# Patient Record
Sex: FEMALE | Race: BLACK OR AFRICAN AMERICAN
[De-identification: names, ages, dates, MRNs, and addresses within clinical notes are randomized per-mention and may not be internally consistent; named-entity substitution may affect disease eponyms.]

---

## 2023-11-13 ENCOUNTER — APPOINTMENT (OUTPATIENT)
Dept: ORTHOPEDIC SURGERY | Facility: CLINIC | Age: 58
End: 2023-11-13

## 2023-12-07 ENCOUNTER — APPOINTMENT (OUTPATIENT)
Dept: ORTHOPEDIC SURGERY | Facility: CLINIC | Age: 58
End: 2023-12-07
Payer: MEDICAID

## 2023-12-07 VITALS — HEIGHT: 63 IN

## 2023-12-07 DIAGNOSIS — Z78.9 OTHER SPECIFIED HEALTH STATUS: ICD-10-CM

## 2023-12-07 PROBLEM — Z00.00 ENCOUNTER FOR PREVENTIVE HEALTH EXAMINATION: Status: ACTIVE | Noted: 2023-12-07

## 2023-12-07 PROCEDURE — 73010 X-RAY EXAM OF SHOULDER BLADE: CPT | Mod: RT

## 2023-12-07 PROCEDURE — 20610 DRAIN/INJ JOINT/BURSA W/O US: CPT | Mod: RT

## 2023-12-07 PROCEDURE — J3490M: CUSTOM

## 2023-12-07 PROCEDURE — 73030 X-RAY EXAM OF SHOULDER: CPT | Mod: RT

## 2023-12-07 PROCEDURE — 99214 OFFICE O/P EST MOD 30 MIN: CPT | Mod: 25

## 2024-01-22 ENCOUNTER — APPOINTMENT (OUTPATIENT)
Dept: ORTHOPEDIC SURGERY | Facility: CLINIC | Age: 59
End: 2024-01-22
Payer: MEDICAID

## 2024-01-22 VITALS — HEIGHT: 63 IN

## 2024-01-22 PROCEDURE — 99214 OFFICE O/P EST MOD 30 MIN: CPT

## 2024-01-22 RX ORDER — DICLOFENAC SODIUM 75 MG/1
75 TABLET, DELAYED RELEASE ORAL
Qty: 20 | Refills: 1 | Status: ACTIVE | COMMUNITY
Start: 2024-01-22 | End: 1900-01-01

## 2024-01-22 NOTE — IMAGING
[Right] : right shoulder [There are no fractures, subluxations or dislocations. No significant abnormalities are seen] : There are no fractures, subluxations or dislocations. No significant abnormalities are seen [Type 2 acromion] : Type 2 acromion [de-identified] :   ----------------------------------------------------------------------------   Right shoulder exam:   Skin: no significant pertinent finding Inspection: no obvious deformity, no obvious masses, no swelling, no effusion, no atrophy ROM:     FF: 90a    ER: 30a    IR: S1 Tenderness:    (+) Anterior/Biceps:    (+) Posterior    (neg) Lateral    (+) Trapezius    (neg) Scapula    (mild) AC joint    (neg) Crepitus with ROM Stability:    (neg) Translation    (neg) Apprehension    (neg) Clicking Additional tests:     + impingment    (+) Neer's    (+)Hawkin's    (neg) Smith's    (neg) Speed    (neg) Cross chest adduction Strength:    FF: 5/5    ER: 5/5    IR: 5/5    Biceps: 5/5    Triceps: 5/5    Distal: 5/5 Neuro: In tact to light touch throughout Vascularity: Extremity warm and well perfused   [FreeTextEntry1] : mild djd, large SA spur

## 2024-01-22 NOTE — HISTORY OF PRESENT ILLNESS
[10] : 10 [3] : 3 [Dull/Aching] : dull/aching [Localized] : localized [Sharp] : sharp [Frequent] : frequent [de-identified] : This is Ms. JENNIFER DOVER  a 58 year old female who comes in today complaining of Rt shoulder pain. Pt states she has been feeling this pain for a while and pain got worse to the point where she cant sleep. When she lays on her bed and when she turnover she has to assist her Rt shoulder to move it. hand is stiff in the am.  [] : no [FreeTextEntry1] : Rt shoulder  [de-identified] : none

## 2024-01-22 NOTE — DISCUSSION/SUMMARY
[Medication Risks Reviewed] : Medication risks reviewed [de-identified] : MRI R shoulder to eval rtc continue PT/ HEP diclofenac 75mg  f/u p mri  ----------------------------------------------------------------------------  Patient warned of specific risks of medication related to bleeding, GI issues, increase blood pressure, and cardiac risks in addition to additional risks.  Patient advised to discuss with PMD  if any presence of stated issues.  ----------------------------------------------------------------------------  All relevant imaging studies pertinent to today's visit, including x-rays, MRI's and/or other advanced imaging studies (CT/etc) were independently interpreted and reviewed with the patient as needed. Implications of the studies together with the patient's clinical picture were discussed to formulate a working diagnosis and management options were detailed.  The patient was advised of the diagnosis.  The natural history of the pathology was explained in full. All questions were answered.  The risks and benefits of conservative and interventional treatment alternatives were explained to the patient  -----------------------------------------------  The patient was advised that an advanced diagnostic/imaging study (MRI/CT/etc) will be ordered to evaluate potential pathology in the affected area(s). The patient was further advised to follow up in the office to review the results of the study and determine further management that may be indicated.    Progress note completed by Santana Saeed PA-C working as a scribe for Dr Styles

## 2024-01-22 NOTE — REASON FOR VISIT
[FreeTextEntry2] : follow up right shoulder. Noted significant improvement from CSI but feels pain has returned. Has also been in PT/ doing HEP and using tylenol with mild help.

## 2024-02-12 ENCOUNTER — APPOINTMENT (OUTPATIENT)
Dept: ORTHOPEDIC SURGERY | Facility: CLINIC | Age: 59
End: 2024-02-12

## 2024-03-29 ENCOUNTER — APPOINTMENT (OUTPATIENT)
Dept: ORTHOPEDIC SURGERY | Facility: CLINIC | Age: 59
End: 2024-03-29
Payer: MEDICAID

## 2024-03-29 VITALS — WEIGHT: 150 LBS | BODY MASS INDEX: 26.58 KG/M2 | HEIGHT: 63 IN

## 2024-03-29 DIAGNOSIS — M24.119 OTHER ARTICULAR CARTILAGE DISORDERS, UNSPECIFIED SHOULDER: ICD-10-CM

## 2024-03-29 DIAGNOSIS — M75.41 IMPINGEMENT SYNDROME OF RIGHT SHOULDER: ICD-10-CM

## 2024-03-29 PROCEDURE — 99214 OFFICE O/P EST MOD 30 MIN: CPT

## 2024-04-12 ENCOUNTER — APPOINTMENT (OUTPATIENT)
Dept: ORTHOPEDIC SURGERY | Facility: CLINIC | Age: 59
End: 2024-04-12
Payer: MEDICAID

## 2024-04-12 PROCEDURE — 99214 OFFICE O/P EST MOD 30 MIN: CPT

## 2024-04-12 NOTE — DISCUSSION/SUMMARY
[Surgical risks reviewed] : Surgical risks reviewed [de-identified] : Reviewed MRI of the R shoulder  Discussed options in the form of continued PT and potential CSI injection of the R shoulder vs arthroscopic surgery Discussed R shoulder arthroscopy, extensive GHD, bursectomy, SAD and possible [place] patch application and possible tenotomy Discussed risk of simon with bicep tenotomy Plan for R shoulder arthroscopy, extensive GHD, bursectomy, SAD and possible Regenten patch application and possible tenotomy ----------------------------------------------------------------------------  The patient was advised of the diagnosis.  The natural history of the pathology was explained in full to the patient. All questions were answered.  The risks and benefits of surgical and non-surgical treatment were explained in full to the patient.  The patient demonstrated a full understanding of the surgical and non-surgical options.  The risks of surgery were outlined in full to the patient including but not limited to pain, stiffness, bleeding, scarring, infection, neurologic injury, vascular injury, failure to resolve symptoms, symptom recurrence, the need for further surgery, non-healing, implant failure, intraoperative fracture, wound breakdown, deep vein thrombosis, pulmonary embolism, anesthesia complications and even death.  The patient understood all the risks and accepted them and understood that other complications could occur that are not mentioned above.  The intraoperative plan, post-operative plan, post-operative expectations and limitations were explained in full.  Importance of postoperative rehabilitation and restriction compliance was explained and emphasized. Expectations from non-surgical treatment were explained in full as well.  The patient demonstrated a complete understanding of the treatment detailed, the risks and alternatives.   ----------------------------------------------------------------------------   All relevant imaging studies pertinent to today's visit, including x-rays, MRI's and/or other advanced imaging studies (CT/etc) were independently interpreted and reviewed with the patient as needed. Implications of the studies together with the patient's clinical picture were discussed to formulate a working diagnosis and management options were detailed.   The patient and/or guardian was advised of the diagnosis.  The natural history of the pathology was explained in full. All questions were answered.  The risks and benefits of conservative and interventional treatment alternatives were explained to the patient  The patient and/or guardian was advised if any advanced diagnostic/imaging study (MRI/CT/etc) is ordered to evaluate potential pathology in the affected area(s), they should follow up in the office to review the results of the study and determine further management that may be indicated.

## 2024-04-12 NOTE — DATA REVIEWED
[MRI] : MRI [Right] : of the right [Shoulder] : shoulder [I independently reviewed and interpreted images and report] : I independently reviewed and interpreted images and report [FreeTextEntry1] :  significant bursitis, cuff tendinopathy of the supraspinatous, mild GH OA, supraspinatous thinning, degenerative labral tearing

## 2024-04-12 NOTE — IMAGING
[Right] : right shoulder [There are no fractures, subluxations or dislocations. No significant abnormalities are seen] : There are no fractures, subluxations or dislocations. No significant abnormalities are seen [Type 2 acromion] : Type 2 acromion [de-identified] :   ----------------------------------------------------------------------------   Right shoulder exam:   Skin: no significant pertinent finding Inspection: no obvious deformity, no obvious masses, no swelling, no effusion, no atrophy ROM:     FF: 90a    ER: 30a    IR: S1 Tenderness:    (+) Anterior/Biceps:    (+) Posterior    (neg) Lateral    (+) Trapezius    (neg) Scapula    (mild) AC joint    (neg) Crepitus with ROM Stability:    (neg) Translation    (neg) Apprehension    (neg) Clicking Additional tests:     + impingment    (+) Neer's    (+)Hawkin's    (neg) Smith's    (neg) Speed    (neg) Cross chest adduction Strength:    FF: 5/5    ER: 5/5    IR: 5/5    Biceps: 5/5    Triceps: 5/5    Distal: 5/5 Neuro: In tact to light touch throughout Vascularity: Extremity warm and well perfused   [FreeTextEntry1] : mild djd, large SA spur

## 2024-04-12 NOTE — HISTORY OF PRESENT ILLNESS
[10] : 10 [3] : 3 [Dull/Aching] : dull/aching [Localized] : localized [Sharp] : sharp [Frequent] : frequent [de-identified] : This is Ms. JENNIFER DOVER  a 58 year old female who comes in today complaining of Rt shoulder pain. Pt states she has been feeling this pain for a while and pain got worse to the point where she cant sleep. When she lays on her bed and when she turnover she has to assist her Rt shoulder to move it. hand is stiff in the am.  [] : no [FreeTextEntry1] : Rt shoulder  [de-identified] : none

## 2024-04-16 PROBLEM — M24.119 LABRAL TEAR OF SHOULDER, DEGENERATIVE: Status: ACTIVE | Noted: 2024-04-12

## 2024-04-16 PROBLEM — M75.41 IMPINGEMENT SYNDROME OF RIGHT SHOULDER: Status: ACTIVE | Noted: 2023-12-07

## 2024-04-16 RX ORDER — CELECOXIB 100 MG/1
100 CAPSULE ORAL
Qty: 30 | Refills: 0 | Status: ACTIVE | COMMUNITY
Start: 2024-03-29

## 2024-04-16 NOTE — DISCUSSION/SUMMARY
[Medication Risks Reviewed] : Medication risks reviewed [de-identified] : MRI R shoulder to eval rtc continue PT/ HEP f/u p mri  ----------------------------------------------------------------------------  Patient warned of specific risks of medication related to bleeding, GI issues, increase blood pressure, and cardiac risks in addition to additional risks.  Patient advised to discuss with PMD  if any presence of stated issues.  ----------------------------------------------------------------------------  All relevant imaging studies pertinent to today's visit, including x-rays, MRI's and/or other advanced imaging studies (CT/etc) were independently interpreted and reviewed with the patient as needed. Implications of the studies together with the patient's clinical picture were discussed to formulate a working diagnosis and management options were detailed.  The patient was advised of the diagnosis.  The natural history of the pathology was explained in full. All questions were answered.  The risks and benefits of conservative and interventional treatment alternatives were explained to the patient  -----------------------------------------------  The patient was advised that an advanced diagnostic/imaging study (MRI/CT/etc) will be ordered to evaluate potential pathology in the affected area(s). The patient was further advised to follow up in the office to review the results of the study and determine further management that may be indicated.    Progress note completed by Santana Saeed PA-C working as a scribe for Dr Styles

## 2024-04-16 NOTE — HISTORY OF PRESENT ILLNESS
[10] : 10 [4] : 4 [Dull/Aching] : dull/aching [Localized] : localized [Sharp] : sharp [Frequent] : frequent [de-identified] : This is Ms. JENNIFER DOVER  a 58 year old female who comes in today complaining of Rt shoulder pain. Pt states she has been feeling this pain for a while and pain got worse to the point where she cant sleep. When she lays on her bed and when she turnover she has to assist her Rt shoulder to move it. hand is stiff in the am.  [] : no [FreeTextEntry1] : Rt shoulder  [de-identified] : none

## 2024-04-16 NOTE — IMAGING
[Right] : right shoulder [There are no fractures, subluxations or dislocations. No significant abnormalities are seen] : There are no fractures, subluxations or dislocations. No significant abnormalities are seen [Type 2 acromion] : Type 2 acromion [de-identified] :   ----------------------------------------------------------------------------   Right shoulder exam:   Skin: no significant pertinent finding Inspection: no obvious deformity, no obvious masses, no swelling, no effusion, no atrophy ROM:     FF: 90a    ER: 30a    IR: S1 Tenderness:    (+) Anterior/Biceps:    (+) Posterior    (neg) Lateral    (+) Trapezius    (neg) Scapula    (mild) AC joint    (neg) Crepitus with ROM Stability:    (neg) Translation    (neg) Apprehension    (neg) Clicking Additional tests:     + impingment    (+) Neer's    (+)Hawkin's    (neg) Smith's    (neg) Speed    (neg) Cross chest adduction Strength:    FF: 5/5    ER: 5/5    IR: 5/5    Biceps: 5/5    Triceps: 5/5    Distal: 5/5 Neuro: In tact to light touch throughout Vascularity: Extremity warm and well perfused   [FreeTextEntry1] : mild djd, large SA spur

## 2024-05-06 ENCOUNTER — APPOINTMENT (OUTPATIENT)
Dept: ORTHOPEDIC SURGERY | Facility: AMBULATORY SURGERY CENTER | Age: 59
End: 2024-05-06
Payer: MEDICAID

## 2024-05-06 PROCEDURE — 29827 SHO ARTHRS SRG RT8TR CUF RPR: CPT | Mod: AS,RT

## 2024-05-06 PROCEDURE — 29823 SHO ARTHRS SRG XTNSV DBRDMT: CPT | Mod: AS,59,RT

## 2024-05-06 PROCEDURE — 29826 SHO ARTHRS SRG DECOMPRESSION: CPT | Mod: AS,RT

## 2024-05-06 PROCEDURE — 29823 SHO ARTHRS SRG XTNSV DBRDMT: CPT | Mod: 59,RT

## 2024-05-06 PROCEDURE — 29827 SHO ARTHRS SRG RT8TR CUF RPR: CPT | Mod: RT

## 2024-05-06 PROCEDURE — 29826 SHO ARTHRS SRG DECOMPRESSION: CPT | Mod: RT

## 2024-05-06 RX ORDER — OXYCODONE AND ACETAMINOPHEN 5; 325 MG/1; MG/1
5-325 TABLET ORAL
Qty: 25 | Refills: 0 | Status: ACTIVE | COMMUNITY
Start: 2024-05-06 | End: 1900-01-01

## 2024-05-17 ENCOUNTER — APPOINTMENT (OUTPATIENT)
Dept: ORTHOPEDIC SURGERY | Facility: CLINIC | Age: 59
End: 2024-05-17
Payer: MEDICAID

## 2024-05-17 VITALS — BODY MASS INDEX: 26.58 KG/M2 | WEIGHT: 150 LBS | HEIGHT: 63 IN

## 2024-05-17 PROCEDURE — 99024 POSTOP FOLLOW-UP VISIT: CPT

## 2024-05-17 NOTE — DISCUSSION/SUMMARY
[Surgical risks reviewed] : Surgical risks reviewed [de-identified] : PO course discussed sling intermittently x 2 wks  Start PT  fu 4 wks   ----------------------------------------------------------------------------    All relevant imaging studies pertinent to today's visit, including x-rays, MRI's and/or other advanced imaging studies (CT/etc) were independently interpreted and reviewed with the patient as needed. Implications of the studies together with the patient's clinical picture were discussed to formulate a working diagnosis and management options were detailed.   The patient and/or guardian was advised of the diagnosis. The natural history of the pathology was explained in full. All questions were answered. The risks and benefits of conservative and interventional treatment alternatives were explained to the patient  The patient and/or guardian was advised if any advanced diagnostic/imaging study (MRI/CT/etc) is ordered to evaluate potential pathology in the affected area(s), they should follow up in the office to review the results of the study and determine further management that may be indicated.

## 2024-05-17 NOTE — IMAGING
[Right] : right shoulder [There are no fractures, subluxations or dislocations. No significant abnormalities are seen] : There are no fractures, subluxations or dislocations. No significant abnormalities are seen [Type 2 acromion] : Type 2 acromion [FreeTextEntry1] : mild djd, large SA spur  [de-identified] : incision CDI mild effusion good rom NVI  sutures removed, steri strips applied

## 2024-05-17 NOTE — HISTORY OF PRESENT ILLNESS
[6] : 6 [Dull/Aching] : dull/aching [Localized] : localized [Sharp] : sharp [Frequent] : frequent [de-identified] : This is Ms. JENNIFER DOVER  a 58 year old female who comes in today complaining of Rt shoulder pain. Pt states she has been feeling this pain for a while and pain got worse to the point where she cant sleep. When she lays on her bed and when she turnover she has to assist her Rt shoulder to move it. hand is stiff in the am.   s/p  RCR w regenetin patch, GHD, SAD, bicep tenotomy 5/6/24 [] : no [FreeTextEntry1] : Rt shoulder  [de-identified] : 5/6/24 [de-identified] : none [de-identified] : 5/6/24 [de-identified] : right shoulder

## 2024-05-17 NOTE — REASON FOR VISIT
[FreeTextEntry2] : 1st post op right shoulder. Doing well. pain is managed with medication. No F/C.  dos: 5/6/24

## 2024-06-19 ENCOUNTER — APPOINTMENT (OUTPATIENT)
Dept: ORTHOPEDIC SURGERY | Facility: CLINIC | Age: 59
End: 2024-06-19
Payer: MEDICAID

## 2024-06-19 DIAGNOSIS — Z98.890 OTHER SPECIFIED POSTPROCEDURAL STATES: ICD-10-CM

## 2024-06-19 DIAGNOSIS — M67.911 UNSPECIFIED DISORDER OF SYNOVIUM AND TENDON, RIGHT SHOULDER: ICD-10-CM

## 2024-06-19 DIAGNOSIS — M75.51 BURSITIS OF RIGHT SHOULDER: ICD-10-CM

## 2024-06-19 PROCEDURE — 99024 POSTOP FOLLOW-UP VISIT: CPT

## 2024-06-19 PROCEDURE — 73030 X-RAY EXAM OF SHOULDER: CPT | Mod: RT

## 2024-06-19 PROCEDURE — 73010 X-RAY EXAM OF SHOULDER BLADE: CPT | Mod: RT

## 2024-06-19 NOTE — HISTORY OF PRESENT ILLNESS
[2] : 2 [1] : 2 [Dull/Aching] : dull/aching [Localized] : localized [Sharp] : sharp [Frequent] : frequent [Sleep] : sleep [de-identified] : This is Ms. JENNIFER DOVER  a 58 year old female who comes in today complaining of Rt shoulder pain. Pt states she has been feeling this pain for a while and pain got worse to the point where she cant sleep. When she lays on her bed and when she turnover she has to assist her Rt shoulder to move it. hand is stiff in the am.   s/p  RCR w regenetin patch, GHD, SAD, bicep tenotomy 5/6/24 06/19/2024 This is Ms. JENNIFER DOVER  a 58 year old female who comes in today for her 2nd PO. She is doing well, pain has decreased. Pt is helping. [] : no [FreeTextEntry1] : Rt shoulder  [FreeTextEntry8] : Achy at night  [de-identified] : 5/6/24 [de-identified] : none [de-identified] : 5/6/24 [de-identified] : right shoulder

## 2024-06-19 NOTE — IMAGING
[Right] : right shoulder [There are no fractures, subluxations or dislocations. No significant abnormalities are seen] : There are no fractures, subluxations or dislocations. No significant abnormalities are seen [Type 2 acromion] : Type 2 acromion [de-identified] :   ----------------------------------------------------------------------------   ROM 0-90 active 130 passive incisions healed  Neuro: In tact to light touch throughout Vascularity: Extremity warm and well perfused    [FreeTextEntry1] : evidence of complete decompression subachromial spur

## 2024-06-19 NOTE — REASON FOR VISIT
[FreeTextEntry2] : 2nd post op right shoulder. Doing well. pain is managed with medication. No F/C.  dos: 5/6/24

## 2024-06-19 NOTE — DISCUSSION/SUMMARY
[Surgical risks reviewed] : Surgical risks reviewed [de-identified] : PO course discussed Continue Physical Therapy f/u 6 weeks  ----------------------------------------------------------------------------    All relevant imaging studies pertinent to today's visit, including x-rays, MRI's and/or other advanced imaging studies (CT/etc) were independently interpreted and reviewed with the patient as needed. Implications of the studies together with the patient's clinical picture were discussed to formulate a working diagnosis and management options were detailed.   The patient and/or guardian was advised of the diagnosis. The natural history of the pathology was explained in full. All questions were answered. The risks and benefits of conservative and interventional treatment alternatives were explained to the patient  The patient and/or guardian was advised if any advanced diagnostic/imaging study (MRI/CT/etc) is ordered to evaluate potential pathology in the affected area(s), they should follow up in the office to review the results of the study and determine further management that may be indicated.

## 2024-07-31 ENCOUNTER — APPOINTMENT (OUTPATIENT)
Dept: ORTHOPEDIC SURGERY | Facility: CLINIC | Age: 59
End: 2024-07-31
Payer: MEDICAID

## 2024-07-31 VITALS — BODY MASS INDEX: 26.58 KG/M2 | WEIGHT: 150 LBS | HEIGHT: 63 IN

## 2024-07-31 DIAGNOSIS — Z98.890 OTHER SPECIFIED POSTPROCEDURAL STATES: ICD-10-CM

## 2024-07-31 PROCEDURE — 99024 POSTOP FOLLOW-UP VISIT: CPT

## 2024-07-31 RX ORDER — METHYLPREDNISOLONE 4 MG/1
4 TABLET ORAL
Qty: 1 | Refills: 0 | Status: ACTIVE | COMMUNITY
Start: 2024-07-31 | End: 1900-01-01

## 2024-07-31 RX ORDER — IBUPROFEN 800 MG/1
800 TABLET, FILM COATED ORAL 3 TIMES DAILY
Qty: 30 | Refills: 0 | Status: ACTIVE | COMMUNITY
Start: 2024-07-31 | End: 1900-01-01

## 2024-07-31 NOTE — DISCUSSION/SUMMARY
[Medication Risks Reviewed] : Medication risks reviewed [de-identified] : PO course discussed Continue Physical Therapy MDP ibuprofen 800mg before attending PT, advised not to take while utilizing MDP f/u 4 weeks  ----------------------------------------------------------------------------    All relevant imaging studies pertinent to today's visit, including x-rays, MRI's and/or other advanced imaging studies (CT/etc) were independently interpreted and reviewed with the patient as needed. Implications of the studies together with the patient's clinical picture were discussed to formulate a working diagnosis and management options were detailed.   The patient and/or guardian was advised of the diagnosis. The natural history of the pathology was explained in full. All questions were answered. The risks and benefits of conservative and interventional treatment alternatives were explained to the patient  The patient and/or guardian was advised if any advanced diagnostic/imaging study (MRI/CT/etc) is ordered to evaluate potential pathology in the affected area(s), they should follow up in the office to review the results of the study and determine further management that may be indicated.

## 2024-07-31 NOTE — HISTORY OF PRESENT ILLNESS
[6] : 6 [1] : 2 [Dull/Aching] : dull/aching [Localized] : localized [Sharp] : sharp [Frequent] : frequent [Sleep] : sleep [de-identified] : This is Ms. JENNIFER DOVER  a 58 year old female who comes in today complaining of Rt shoulder pain. Pt states she has been feeling this pain for a while and pain got worse to the point where she cant sleep. When she lays on her bed and when she turnover she has to assist her Rt shoulder to move it. hand is stiff in the am.   s/p  RCR w regenetin patch, GHD, SAD, bicep tenotomy 5/6/24 06/19/2024 This is Ms. JENNIFER DOVER  a 58 year old female who comes in today for her 2nd PO. She is doing well, pain has decreased. Pt is helping. [] : no [FreeTextEntry1] : Rt shoulder  [FreeTextEntry8] : Achy at night  [de-identified] : 5/6/24 [de-identified] : physical therapy [de-identified] : 5/6/24 [de-identified] : right shoulder

## 2024-07-31 NOTE — IMAGING
[Right] : right shoulder [There are no fractures, subluxations or dislocations. No significant abnormalities are seen] : There are no fractures, subluxations or dislocations. No significant abnormalities are seen [Type 2 acromion] : Type 2 acromion [de-identified] :   ----------------------------------------------------------------------------   ROM  0-90 active 120 passive ER 25 IR lat hip incisions healed strength  5/5 FF ER 5/5 IR 5/5  Neuro: In tact to light touch throughout Vascularity: Extremity warm and well perfused    [FreeTextEntry1] : evidence of complete decompression subachromial spur

## 2024-08-20 ENCOUNTER — APPOINTMENT (OUTPATIENT)
Dept: MRI IMAGING | Facility: CLINIC | Age: 59
End: 2024-08-20

## 2024-08-28 ENCOUNTER — APPOINTMENT (OUTPATIENT)
Dept: ORTHOPEDIC SURGERY | Facility: CLINIC | Age: 59
End: 2024-08-28
Payer: MEDICAID

## 2024-08-28 VITALS — WEIGHT: 150 LBS | HEIGHT: 63 IN | BODY MASS INDEX: 26.58 KG/M2

## 2024-08-28 DIAGNOSIS — Z98.890 OTHER SPECIFIED POSTPROCEDURAL STATES: ICD-10-CM

## 2024-08-28 PROCEDURE — 99213 OFFICE O/P EST LOW 20 MIN: CPT

## 2024-08-28 RX ORDER — CELECOXIB 100 MG/1
100 CAPSULE ORAL
Qty: 30 | Refills: 0 | Status: ACTIVE | COMMUNITY
Start: 2024-08-28 | End: 1900-01-01

## 2024-08-28 RX ORDER — CYCLOBENZAPRINE HYDROCHLORIDE 5 MG/1
5 TABLET, FILM COATED ORAL
Qty: 30 | Refills: 0 | Status: ACTIVE | COMMUNITY
Start: 2024-08-28 | End: 1900-01-01

## 2024-08-28 NOTE — REASON FOR VISIT
[FreeTextEntry2] : follow up right shoulder, ibuprofen did not provide pain relief, MDP gave relief dos: 5/6/24,

## 2024-08-28 NOTE — IMAGING
[Right] : right shoulder [There are no fractures, subluxations or dislocations. No significant abnormalities are seen] : There are no fractures, subluxations or dislocations. No significant abnormalities are seen [Type 2 acromion] : Type 2 acromion [de-identified] :   ----------------------------------------------------------------------------   ROM  0-110 active 130 passive ER 20 IR lat hip incisions healed strength  5/5 FF ER 5/5 IR 5/5  Neuro: In tact to light touch throughout Vascularity: Extremity warm and well perfused    [FreeTextEntry1] : evidence of complete decompression subachromial spur

## 2024-08-28 NOTE — DISCUSSION/SUMMARY
[Medication Risks Reviewed] : Medication risks reviewed [de-identified] : Continue Physical Therapy per protocol Discussed home exercises / stretching with Pt Activity modification cyclobenzaprine celebrex Discussed CSI next visit f/u 4 weeks  ----------------------------------------------------------------------------    All relevant imaging studies pertinent to today's visit, including x-rays, MRI's and/or other advanced imaging studies (CT/etc) were independently interpreted and reviewed with the patient as needed. Implications of the studies together with the patient's clinical picture were discussed to formulate a working diagnosis and management options were detailed.   The patient and/or guardian was advised of the diagnosis. The natural history of the pathology was explained in full. All questions were answered. The risks and benefits of conservative and interventional treatment alternatives were explained to the patient  The patient and/or guardian was advised if any advanced diagnostic/imaging study (MRI/CT/etc) is ordered to evaluate potential pathology in the affected area(s), they should follow up in the office to review the results of the study and determine further management that may be indicated.

## 2024-08-28 NOTE — HISTORY OF PRESENT ILLNESS
[7] : 7 [1] : 2 [Dull/Aching] : dull/aching [Localized] : localized [Sharp] : sharp [Frequent] : frequent [Sleep] : sleep [de-identified] : This is Ms. JENNIFER DOVER  a 58 year old female who comes in today complaining of Rt shoulder pain. Pt states she has been feeling this pain for a while and pain got worse to the point where she cant sleep. When she lays on her bed and when she turnover she has to assist her Rt shoulder to move it. hand is stiff in the am.   s/p  RCR w regenetin patch, GHD, SAD, bicep tenotomy 5/6/24 06/19/2024 This is Ms. JENNIFER DOVER  a 58 year old female who comes in today for her 2nd PO. She is doing well, pain has decreased. Pt is helping. [] : no [FreeTextEntry1] : Rt shoulder  [FreeTextEntry8] : Achy at night  [de-identified] : 5/6/24 [de-identified] : physical therapy [de-identified] : 5/6/24 [de-identified] : right shoulder

## 2024-09-25 ENCOUNTER — APPOINTMENT (OUTPATIENT)
Dept: ORTHOPEDIC SURGERY | Facility: CLINIC | Age: 59
End: 2024-09-25

## 2024-09-30 ENCOUNTER — APPOINTMENT (OUTPATIENT)
Dept: ORTHOPEDIC SURGERY | Facility: CLINIC | Age: 59
End: 2024-09-30

## 2024-09-30 VITALS — BODY MASS INDEX: 26.58 KG/M2 | WEIGHT: 150 LBS | HEIGHT: 63 IN

## 2024-09-30 DIAGNOSIS — Z98.890 OTHER SPECIFIED POSTPROCEDURAL STATES: ICD-10-CM

## 2024-09-30 PROCEDURE — 99213 OFFICE O/P EST LOW 20 MIN: CPT | Mod: 25

## 2024-09-30 PROCEDURE — 20610 DRAIN/INJ JOINT/BURSA W/O US: CPT | Mod: RT

## 2024-09-30 NOTE — DISCUSSION/SUMMARY
[Medication Risks Reviewed] : Medication risks reviewed [de-identified] : Discussed options, Continue PT Plan for right shoulder IAC f/u 6-8 weeks  ----------------------------------------------------------------------------  Large joint corticosteroid injection given: Right shoulder intraarticular  Patient indicated for injection after trial of rest, OTC medications including aspirin, Ibuprofen, Aleve etc or prescription NSAIDS, and/or exercises at home and/ or physical therapy without satisfactory response.  Patient has symptoms including pain, swelling, and/or decreased mobility in the joint. The risks, benefits, and alternatives to corticosteroid injection were explained in full to the patient, including but not limited to infection, sepsis, bleeding, scarring, skin discoloration, temporary increase in pain, syncopal episode, failure to resolve symptoms, allergic reaction, symptom recurrence, and elevation of blood sugar in diabetics. Patient understood the risks. All questions were answered. After discussion of options, patient requested an injection.   Oral informed consent was obtained and sterile technique was utilized for the procedure including the preparation of the solutions used for the injection and betadine followed by alcohol prep to the injection site. Anesthesia was given with ethyl chloride sprayed topically. The injection was delivered. Patient tolerated the procedure well.   Post Procedure Instructions: Patient was advised to call if redness, pain, or fever occur and apply ice for 15 min on and 15 min off later today  Medications delivered: Kenalog 10 mg, Lidocaine: 4 cc ----------------------------------------------------------------------------    All relevant imaging studies pertinent to today's visit, including x-rays, MRI's and/or other advanced imaging studies (CT/etc) were independently interpreted and reviewed with the patient as needed. Implications of the studies together with the patient's clinical picture were discussed to formulate a working diagnosis and management options were detailed.   The patient and/or guardian was advised of the diagnosis. The natural history of the pathology was explained in full. All questions were answered. The risks and benefits of conservative and interventional treatment alternatives were explained to the patient  The patient and/or guardian was advised if any advanced diagnostic/imaging study (MRI/CT/etc) is ordered to evaluate potential pathology in the affected area(s), they should follow up in the office to review the results of the study and determine further management that may be indicated.

## 2024-09-30 NOTE — REASON FOR VISIT
[FreeTextEntry2] : follow up right shoulder. No relief from celebrex/ cyclobenzaprine. continues PT and HEP with slight improvement  dos: 5/6/24,

## 2024-09-30 NOTE — HISTORY OF PRESENT ILLNESS
[10] : 10 [1] : 2 [Dull/Aching] : dull/aching [Localized] : localized [Sharp] : sharp [Frequent] : frequent [Sleep] : sleep [de-identified] : This is Ms. JENNIFER DOVER  a 58 year old female who comes in today complaining of Rt shoulder pain. Pt states she has been feeling this pain for a while and pain got worse to the point where she cant sleep. When she lays on her bed and when she turnover she has to assist her Rt shoulder to move it. hand is stiff in the am.   s/p  RCR w regenetin patch, GHD, SAD, bicep tenotomy 5/6/24 06/19/2024 This is Ms. JENNIFER DOVER  a 58 year old female who comes in today for her 2nd PO. She is doing well, pain has decreased. Pt is helping. [] : no [FreeTextEntry1] : Rt shoulder  [FreeTextEntry8] : Achy at night  [de-identified] : 5/6/24 [de-identified] : physical therapy [de-identified] : 5/6/24 [de-identified] : right shoulder

## 2024-09-30 NOTE — IMAGING
[Right] : right shoulder [There are no fractures, subluxations or dislocations. No significant abnormalities are seen] : There are no fractures, subluxations or dislocations. No significant abnormalities are seen [Type 2 acromion] : Type 2 acromion [de-identified] :   ----------------------------------------------------------------------------   ROM  0-110 active 130 passive ER 20 IR lat hip incisions healed strength  5/5 FF ER 5/5 IR 5/5  Neuro: In tact to light touch throughout Vascularity: Extremity warm and well perfused    [FreeTextEntry1] : evidence of complete decompression subachromial spur

## 2024-10-09 ENCOUNTER — APPOINTMENT (OUTPATIENT)
Dept: ORTHOPEDIC SURGERY | Facility: CLINIC | Age: 59
End: 2024-10-09
Payer: MEDICAID

## 2024-10-09 DIAGNOSIS — M75.42 IMPINGEMENT SYNDROME OF LEFT SHOULDER: ICD-10-CM

## 2024-10-09 PROCEDURE — 73010 X-RAY EXAM OF SHOULDER BLADE: CPT | Mod: LT

## 2024-10-09 PROCEDURE — 73030 X-RAY EXAM OF SHOULDER: CPT | Mod: LT

## 2024-10-09 PROCEDURE — 99213 OFFICE O/P EST LOW 20 MIN: CPT

## 2024-10-09 RX ORDER — CELECOXIB 100 MG/1
100 CAPSULE ORAL
Qty: 30 | Refills: 0 | Status: ACTIVE | COMMUNITY
Start: 2024-10-09 | End: 1900-01-01

## 2024-11-22 ENCOUNTER — APPOINTMENT (OUTPATIENT)
Dept: ORTHOPEDIC SURGERY | Facility: CLINIC | Age: 59
End: 2024-11-22
Payer: MEDICAID

## 2024-11-22 DIAGNOSIS — M75.42 IMPINGEMENT SYNDROME OF LEFT SHOULDER: ICD-10-CM

## 2024-11-22 DIAGNOSIS — Z98.890 OTHER SPECIFIED POSTPROCEDURAL STATES: ICD-10-CM

## 2024-11-22 DIAGNOSIS — M75.01 ADHESIVE CAPSULITIS OF RIGHT SHOULDER: ICD-10-CM

## 2024-11-22 PROCEDURE — 99214 OFFICE O/P EST MOD 30 MIN: CPT

## 2024-11-22 RX ORDER — METHYLPREDNISOLONE 4 MG/1
4 TABLET ORAL
Qty: 1 | Refills: 0 | Status: ACTIVE | COMMUNITY
Start: 2024-11-22 | End: 1900-01-01

## 2024-11-22 RX ORDER — METHOCARBAMOL 750 MG/1
750 TABLET, FILM COATED ORAL
Qty: 20 | Refills: 0 | Status: ACTIVE | COMMUNITY
Start: 2024-11-22 | End: 1900-01-01

## 2025-01-03 ENCOUNTER — APPOINTMENT (OUTPATIENT)
Dept: ORTHOPEDIC SURGERY | Facility: CLINIC | Age: 60
End: 2025-01-03
Payer: COMMERCIAL

## 2025-01-03 DIAGNOSIS — Z98.890 OTHER SPECIFIED POSTPROCEDURAL STATES: ICD-10-CM

## 2025-01-03 DIAGNOSIS — M75.01 ADHESIVE CAPSULITIS OF RIGHT SHOULDER: ICD-10-CM

## 2025-01-03 DIAGNOSIS — M75.42 IMPINGEMENT SYNDROME OF LEFT SHOULDER: ICD-10-CM

## 2025-01-03 PROCEDURE — J3490M: CUSTOM

## 2025-01-03 PROCEDURE — 99215 OFFICE O/P EST HI 40 MIN: CPT | Mod: 25

## 2025-01-03 PROCEDURE — 20610 DRAIN/INJ JOINT/BURSA W/O US: CPT | Mod: 50

## 2025-01-03 RX ORDER — NAPROXEN 500 MG/1
500 TABLET ORAL
Qty: 20 | Refills: 0 | Status: ACTIVE | COMMUNITY
Start: 2025-01-03 | End: 1900-01-01

## 2025-02-28 ENCOUNTER — APPOINTMENT (OUTPATIENT)
Dept: ORTHOPEDIC SURGERY | Facility: CLINIC | Age: 60
End: 2025-02-28